# Patient Record
Sex: FEMALE | Race: WHITE | NOT HISPANIC OR LATINO | Employment: FULL TIME | URBAN - METROPOLITAN AREA
[De-identification: names, ages, dates, MRNs, and addresses within clinical notes are randomized per-mention and may not be internally consistent; named-entity substitution may affect disease eponyms.]

---

## 2018-11-08 ENCOUNTER — NON-PROVIDER VISIT (OUTPATIENT)
Dept: URGENT CARE | Facility: CLINIC | Age: 38
End: 2018-11-08

## 2018-11-08 DIAGNOSIS — Z02.1 PRE-EMPLOYMENT DRUG SCREENING: ICD-10-CM

## 2018-11-08 LAB
AMP AMPHETAMINE: NORMAL
BAR BARBITURATES: NORMAL
BZO BENZODIAZEPINES: NORMAL
COC COCAINE: NORMAL
INT CON NEG: NORMAL
INT CON POS: NORMAL
MDMA ECSTASY: NORMAL
MET METHAMPHETAMINES: NORMAL
MTD METHADONE: NORMAL
OPI OPIATES: NORMAL
OXY OXYCODONE: NORMAL
PCP PHENCYCLIDINE: NORMAL
POC URINE DRUG SCREEN OCDRS: NEGATIVE
THC: NORMAL

## 2018-11-08 PROCEDURE — 80305 DRUG TEST PRSMV DIR OPT OBS: CPT | Performed by: PHYSICIAN ASSISTANT

## 2019-03-11 ENCOUNTER — HOSPITAL ENCOUNTER (EMERGENCY)
Facility: MEDICAL CENTER | Age: 39
End: 2019-03-11

## 2019-03-11 VITALS
SYSTOLIC BLOOD PRESSURE: 180 MMHG | HEIGHT: 68 IN | HEART RATE: 103 BPM | RESPIRATION RATE: 18 BRPM | OXYGEN SATURATION: 99 % | TEMPERATURE: 98.2 F | BODY MASS INDEX: 26.1 KG/M2 | WEIGHT: 172.18 LBS | DIASTOLIC BLOOD PRESSURE: 120 MMHG

## 2019-03-11 LAB
APPEARANCE UR: CLEAR
BACTERIA #/AREA URNS HPF: ABNORMAL /HPF
BILIRUB UR QL STRIP.AUTO: NEGATIVE
COLOR UR: YELLOW
EPI CELLS #/AREA URNS HPF: ABNORMAL /HPF
GLUCOSE UR STRIP.AUTO-MCNC: NEGATIVE MG/DL
KETONES UR STRIP.AUTO-MCNC: ABNORMAL MG/DL
LEUKOCYTE ESTERASE UR QL STRIP.AUTO: NEGATIVE
MICRO URNS: ABNORMAL
NITRITE UR QL STRIP.AUTO: POSITIVE
PH UR STRIP.AUTO: 6 [PH]
PROT UR QL STRIP: NEGATIVE MG/DL
RBC # URNS HPF: ABNORMAL /HPF
RBC UR QL AUTO: NEGATIVE
SP GR UR STRIP.AUTO: 1.01
WBC #/AREA URNS HPF: ABNORMAL /HPF

## 2019-03-11 PROCEDURE — 302449 STATCHG TRIAGE ONLY (STATISTIC)

## 2019-03-11 PROCEDURE — 81001 URINALYSIS AUTO W/SCOPE: CPT

## 2019-03-11 NOTE — ED TRIAGE NOTES
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 She states that now she is weak and has dark brown blood in vomit. Patient had similar episode last November when she had a bleeding ulcer and was admitted for 7 days at [facility]. She states that this feels the exact same.

## 2019-03-12 NOTE — ED NOTES
PT called again to be placed into a room and pt did not answer.  This tech called down the [facility] and outside.

## 2019-03-12 NOTE — ED NOTES
Called pt to round and re-check vitals, unable to locate pt.  Triage nurse notified, will continue to locate pt for re-check.

## 2019-06-26 ENCOUNTER — NON-PROVIDER VISIT (OUTPATIENT)
Dept: URGENT CARE | Facility: PHYSICIAN GROUP | Age: 39
End: 2019-06-26

## 2019-06-26 DIAGNOSIS — Z02.1 PRE-EMPLOYMENT DRUG SCREENING: ICD-10-CM

## 2019-06-26 LAB
AMP AMPHETAMINE: NORMAL
COC COCAINE: NORMAL
INT CON NEG: NEGATIVE
INT CON POS: POSITIVE
MET METHAMPHETAMINES: NORMAL
OPI OPIATES: NORMAL
PCP PHENCYCLIDINE: NORMAL
POC DRUG COMMENT 753798-OCCUPATIONAL HEALTH: NEGATIVE
THC: NORMAL

## 2019-06-26 PROCEDURE — 80305 DRUG TEST PRSMV DIR OPT OBS: CPT | Performed by: PHYSICIAN ASSISTANT

## 2019-09-07 ENCOUNTER — OFFICE VISIT (OUTPATIENT)
Dept: URGENT CARE | Facility: PHYSICIAN GROUP | Age: 39
End: 2019-09-07

## 2019-09-07 VITALS
TEMPERATURE: 98.5 F | RESPIRATION RATE: 18 BRPM | OXYGEN SATURATION: 99 % | DIASTOLIC BLOOD PRESSURE: 108 MMHG | SYSTOLIC BLOOD PRESSURE: 158 MMHG | HEART RATE: 116 BPM

## 2019-09-07 DIAGNOSIS — J44.1 COPD WITH ACUTE EXACERBATION (HCC): ICD-10-CM

## 2019-09-07 DIAGNOSIS — J45.901 EXACERBATION OF PERSISTENT ASTHMA, UNSPECIFIED ASTHMA SEVERITY: ICD-10-CM

## 2019-09-07 DIAGNOSIS — R06.02 SHORTNESS OF BREATH: ICD-10-CM

## 2019-09-07 DIAGNOSIS — R32 URINARY INCONTINENCE, UNSPECIFIED TYPE: ICD-10-CM

## 2019-09-07 PROCEDURE — 99204 OFFICE O/P NEW MOD 45 MIN: CPT | Performed by: FAMILY MEDICINE

## 2019-09-07 RX ORDER — TRIAMCINOLONE ACETONIDE 40 MG/ML
60 INJECTION, SUSPENSION INTRA-ARTICULAR; INTRAMUSCULAR ONCE
Status: COMPLETED | OUTPATIENT
Start: 2019-09-07 | End: 2019-09-07

## 2019-09-07 RX ORDER — IPRATROPIUM BROMIDE AND ALBUTEROL SULFATE 2.5; .5 MG/3ML; MG/3ML
3 SOLUTION RESPIRATORY (INHALATION) EVERY 4 HOURS PRN
Qty: 30 VIAL | Refills: 2 | Status: SHIPPED | OUTPATIENT
Start: 2019-09-07 | End: 2021-03-26

## 2019-09-07 RX ORDER — IPRATROPIUM BROMIDE AND ALBUTEROL SULFATE 2.5; .5 MG/3ML; MG/3ML
3 SOLUTION RESPIRATORY (INHALATION) ONCE
Status: COMPLETED | OUTPATIENT
Start: 2019-09-07 | End: 2019-09-07

## 2019-09-07 RX ORDER — OXYBUTYNIN CHLORIDE 10 MG/1
10 TABLET, EXTENDED RELEASE ORAL DAILY
Qty: 30 TAB | Refills: 0 | Status: SHIPPED | OUTPATIENT
Start: 2019-09-07 | End: 2021-03-26

## 2019-09-07 RX ORDER — ALBUTEROL SULFATE 90 UG/1
AEROSOL, METERED RESPIRATORY (INHALATION)
Qty: 1 INHALER | Refills: 2 | Status: SHIPPED | OUTPATIENT
Start: 2019-09-07 | End: 2021-03-26

## 2019-09-07 RX ORDER — PREDNISONE 20 MG/1
TABLET ORAL
Qty: 9 TAB | Refills: 0 | Status: CANCELLED | OUTPATIENT
Start: 2019-09-07

## 2019-09-07 RX ORDER — METHYLPREDNISOLONE SODIUM SUCCINATE 125 MG/2ML
125 INJECTION, POWDER, LYOPHILIZED, FOR SOLUTION INTRAMUSCULAR; INTRAVENOUS ONCE
Status: CANCELLED | OUTPATIENT
Start: 2019-09-07 | End: 2019-09-07

## 2019-09-07 RX ADMIN — TRIAMCINOLONE ACETONIDE 60 MG: 40 INJECTION, SUSPENSION INTRA-ARTICULAR; INTRAMUSCULAR at 10:56

## 2019-09-07 RX ADMIN — IPRATROPIUM BROMIDE AND ALBUTEROL SULFATE 3 ML: 2.5; .5 SOLUTION RESPIRATORY (INHALATION) at 09:41

## 2019-09-07 NOTE — PROGRESS NOTES
Chief Complaint:    Chief Complaint   Patient presents with   • Shortness of Breath       History of Present Illness:    This is a new problem. She presents with severe shortness of breath. She has trouble talking due to the shortness of breath and coughing. She is out of her MDI. She was agreeable to Duoneb nebulizer given and afterwards her shortness of breath improved and she could talk in full sentences. She requests Rx for nebulizer machine and nebs.    She reports she has history of Asthma and COPD. She usually uses ProAir MDI but has run out. Her breathing has not been good for at least 3 months, but got much worse in past 3 days. She has taken Prednisone in past but it made her feel weird.     She also reports needing multiple PCP issues addressed as she has not seen a doctor in a long time. Most urgently, she is requesting something for urine incontinence. She has taken something in the past for this with help, she thinks it was Oxybutynin 10 mg.      Review of Systems:    Constitutional: Negative for fever, chills, and diaphoresis.   Eyes: Negative for change in vision, photophobia, pain, redness, and discharge.  ENT: Negative for ear pain, ear discharge, hearing loss, tinnitus, nasal congestion, nosebleeds, and sore throat.    Respiratory: See HPI.  Cardiovascular: Negative for chest pain, palpitations, orthopnea, claudication, leg swelling, and PND.   Gastrointestinal: Negative for abdominal pain, nausea, vomiting, diarrhea, constipation, blood in stool, and melena.   Genitourinary: See HPI.  Musculoskeletal: Negative for myalgias, joint pain, neck pain, and back pain.   Skin: Negative for rash and itching.   Neurological: Negative for dizziness, tingling, tremors, sensory change, speech change, focal weakness, seizures, loss of consciousness, and headaches.   Endo: Negative for polydipsia.   Heme: Does not bruise/bleed easily.   Psychiatric/Behavioral: No new symptoms.      Past Medical History:    Past  Medical History:   Diagnosis Date   • Anemia    • Anxiety    • Borderline personality disorder (HCC)    • COPD (chronic obstructive pulmonary disease) (HCC)    • Depression    • PUD (peptic ulcer disease)      Past Surgical History:    History reviewed. No pertinent surgical history.    Social History:    Social History     Socioeconomic History   • Marital status: Other     Spouse name: Not on file   • Number of children: Not on file   • Years of education: Not on file   • Highest education level: Not on file   Occupational History   • Not on file   Social Needs   • Financial resource strain: Not on file   • Food insecurity:     Worry: Not on file     Inability: Not on file   • Transportation needs:     Medical: Not on file     Non-medical: Not on file   Tobacco Use   • Smoking status: Never Smoker   • Smokeless tobacco: Never Used   Substance and Sexual Activity   • Alcohol use: No   • Drug use: No   • Sexual activity: Not on file   Lifestyle   • Physical activity:     Days per week: Not on file     Minutes per session: Not on file   • Stress: Not on file   Relationships   • Social connections:     Talks on phone: Not on file     Gets together: Not on file     Attends Yarsani service: Not on file     Active member of club or organization: Not on file     Attends meetings of clubs or organizations: Not on file     Relationship status: Not on file   • Intimate partner violence:     Fear of current or ex partner: Not on file     Emotionally abused: Not on file     Physically abused: Not on file     Forced sexual activity: Not on file   Other Topics Concern   • Not on file   Social History Narrative   • Not on file     Family History:    History reviewed. No pertinent family history.    Medications:    No current outpatient medications on file prior to visit.     No current facility-administered medications on file prior to visit.      Allergies:    Allergies   Allergen Reactions   • Morphine      Vomiting     •  Penicillin G      Rash     • Sulfa Drugs      Rash         Vitals:    Vitals:    09/07/19 0931   BP: 158/108   Pulse: (!) 116   Resp: 18   Temp: 36.9 °C (98.5 °F)   TempSrc: Temporal   SpO2: 99%       Physical Exam:    Constitutional: Vital signs reviewed. Appears well-developed and well-nourished. She has severe shortness of breath on presentation and she is coughing on presentation.  Eyes: Sclera white, conjunctivae clear.   ENT: External ears normal. Hearing normal. Nasal mucosa pink. Lips/teeth are normal. Oral mucosa pink and moist. Posterior pharynx: WNL.  Neck: Neck supple.   Cardiovascular: Regular rate and rhythm. No murmur.  Pulmonary/Chest: Respirations markedly labored. Moderate diffuse rales and rhonchi bilaterally.  Lymph: Cervical nodes without tenderness or enlargement.  Musculoskeletal: Initially brought in via wheelchair. After neb treatment: Normal gait. Normal range of motion. No muscular atrophy or weakness.  Neurological: Alert and oriented to person, place, and time. Muscle tone normal. Coordination normal.   Skin: No rashes or lesions. Warm, dry, normal turgor.  Psychiatric: Normal mood and affect. Behavior is normal. Judgment and thought content normal.       Assessment / Plan:    1. Shortness of breath  - ipratropium-albuterol (DUONEB) nebulizer solution    2. Exacerbation of persistent asthma, unspecified asthma severity  - albuterol 108 (90 Base) MCG/ACT Aero Soln inhalation aerosol; 2 PUFFS EVERY 4 HOURS ONLY IF NEEDED FOR COUGH, WHEEZING, OR SHORTNESS OF BREATH.  Dispense: 1 Inhaler; Refill: 2  - triamcinolone acetonide (KENALOG-40) injection 60 mg  - ipratropium-albuterol (DUONEB) 0.5-2.5 (3) MG/3ML nebulizer solution; 3 mL by Nebulization route every four hours as needed for Shortness of Breath.  Dispense: 30 Vial; Refill: 2  - Respiratory Therapy Supplies (NEBULIZER COMPRESSOR) Kit; Use with nebulizer solution.  Dispense: 1 Kit; Refill: 0    3. COPD with acute exacerbation (HCC)  -  albuterol 108 (90 Base) MCG/ACT Aero Soln inhalation aerosol; 2 PUFFS EVERY 4 HOURS ONLY IF NEEDED FOR COUGH, WHEEZING, OR SHORTNESS OF BREATH.  Dispense: 1 Inhaler; Refill: 2  - triamcinolone acetonide (KENALOG-40) injection 60 mg  - ipratropium-albuterol (DUONEB) 0.5-2.5 (3) MG/3ML nebulizer solution; 3 mL by Nebulization route every four hours as needed for Shortness of Breath.  Dispense: 30 Vial; Refill: 2  - Respiratory Therapy Supplies (NEBULIZER COMPRESSOR) Kit; Use with nebulizer solution.  Dispense: 1 Kit; Refill: 0    4. Urinary incontinence, unspecified type  - oxybutynin SR (DITROPAN-XL) 10 MG CR tablet; Take 1 Tab by mouth every day. Take to control urine incontinence.  Dispense: 30 Tab; Refill: 0      Work note given - excuse for 9/7/19.    Discussed with her DDX, management options, and risks, benefits, and alternatives to treatment plan agreed upon.    Agreeable to Duoneb neb treatment given in clinic. After neb treatment, she felt much improved.    Agreeable to medications given and prescribed. She was recommended to see PCP for multiple chronic PCP issues.    She will return to urgent care if needed.

## 2019-09-07 NOTE — LETTER
September 7, 2019         Patient: Sasha Espinoza   YOB: 1980   Date of Visit: 9/7/2019           To Whom it May Concern:    Sasha Espinoza was seen in my clinic on 9/7/2019.     Please excuse from work for 9/7/19 due to medical condition.    If you have any questions or concerns, please don't hesitate to call.        Sincerely,           Dalton Cannon M.D.  Electronically Signed

## 2020-09-18 ENCOUNTER — OFFICE VISIT (OUTPATIENT)
Dept: URGENT CARE | Facility: PHYSICIAN GROUP | Age: 40
End: 2020-09-18
Payer: MEDICAID

## 2020-09-18 VITALS
HEART RATE: 89 BPM | BODY MASS INDEX: 27.65 KG/M2 | TEMPERATURE: 98 F | RESPIRATION RATE: 18 BRPM | SYSTOLIC BLOOD PRESSURE: 122 MMHG | DIASTOLIC BLOOD PRESSURE: 78 MMHG | OXYGEN SATURATION: 97 % | WEIGHT: 182.4 LBS | HEIGHT: 68 IN

## 2020-09-18 DIAGNOSIS — B86 SCABIES INFESTATION: ICD-10-CM

## 2020-09-18 DIAGNOSIS — L29.9 PRURITUS: ICD-10-CM

## 2020-09-18 PROCEDURE — 99214 OFFICE O/P EST MOD 30 MIN: CPT | Performed by: PHYSICIAN ASSISTANT

## 2020-09-18 RX ORDER — PERMETHRIN 50 MG/G
CREAM TOPICAL
Qty: 60 G | Refills: 0 | Status: SHIPPED | OUTPATIENT
Start: 2020-09-18 | End: 2021-03-26

## 2020-09-18 ASSESSMENT — ENCOUNTER SYMPTOMS
CONSTIPATION: 0
NAUSEA: 0
COUGH: 0
FEVER: 0
VOMITING: 0
HEADACHES: 0
DIARRHEA: 0
MYALGIAS: 0
SORE THROAT: 0
SHORTNESS OF BREATH: 0
ABDOMINAL PAIN: 0
EYE PAIN: 0
CHILLS: 0

## 2020-09-18 NOTE — PROGRESS NOTES
"Subjective:   Sasha Espinoza is a 40 y.o. 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  Her patient had diffuse pruritus across the back as well as upper extremities which is been progressive for several weeks and she started to develop the same symptoms for about 1 week.  She has noticed small little macules scattered across her back and she notes that she has had ample close contact of the patient      Review of Systems   Constitutional: Negative for chills and fever.   HENT: Negative for congestion, ear pain and sore throat.    Eyes: Negative for pain.   Respiratory: Negative for cough and shortness of breath.    Cardiovascular: Negative for chest pain.   Gastrointestinal: Negative for abdominal pain, constipation, diarrhea, nausea and vomiting.   Genitourinary: Negative for dysuria.   Musculoskeletal: Negative for myalgias.   Skin: Positive for itching. Negative for rash.   Neurological: Negative for headaches.       Medications:    • albuterol Aers  • ipratropium-albuterol  • Nebulizer Compressor Kit  • oxybutynin SR  • permethrin Crea    Allergies: Morphine, Penicillin g, and Sulfa drugs    Problem List: Sasha Espinoza does not have a problem list on file.    Surgical History:  No past surgical history on file.    Past Social Hx: Sasha Espinoza  reports that she has never smoked. She has never used smokeless tobacco. She reports that she does not drink alcohol or use drugs.     Past Family Hx:  Sasha Espinoza family history is not on file.     Problem list, medications, and allergies reviewed by myself today in Epic.     Objective:     /78   Pulse 89   Temp 36.7 °C (98 °F)   Resp 18   Ht 1.727 m (5' 8\")   Wt 82.7 kg (182 lb 6.4 oz)   SpO2 97%   BMI 27.73 kg/m²     Physical Exam  Vitals signs " reviewed.   Constitutional:       Appearance: Normal appearance.   HENT:      Head: Normocephalic and atraumatic.      Right Ear: External ear normal.      Left Ear: External ear normal.      Nose: Nose normal.      Mouth/Throat:      Mouth: Mucous membranes are moist.   Eyes:      Conjunctiva/sclera: Conjunctivae normal.   Cardiovascular:      Rate and Rhythm: Normal rate.   Pulmonary:      Effort: Pulmonary effort is normal.   Skin:     General: Skin is warm and dry.      Capillary Refill: Capillary refill takes less than 2 seconds.      Comments: Scattered macules and papules across the posterior surface of the back as well as the upper neck and on the forearms.  No obvious burrows   Neurological:      Mental Status: She is alert and oriented to person, place, and time.         Assessment/Plan:     Diagnosis and associated orders:     1. Scabies infestation  permethrin (ELIMITE) 5 % Cream   2. Pruritus  permethrin (ELIMITE) 5 % Cream      Comments/MDM:     • Gave instructions on use, return precautions, expected side effects.  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    Advised the patient to follow-up with the primary care physician for recheck, reevaluation, and consideration of further management.    Please note that this dictation was created using voice recognition software. I have made a reasonable attempt to correct obvious errors, but I expect that there are errors of grammar and possibly content that I did not discover before finalizing the note.    This note was electronically signed by Wei Cabezas PA-C

## 2021-03-26 ENCOUNTER — OCCUPATIONAL MEDICINE (OUTPATIENT)
Dept: URGENT CARE | Facility: PHYSICIAN GROUP | Age: 41
End: 2021-03-26
Payer: COMMERCIAL

## 2021-03-26 ENCOUNTER — APPOINTMENT (OUTPATIENT)
Dept: RADIOLOGY | Facility: IMAGING CENTER | Age: 41
End: 2021-03-26
Attending: FAMILY MEDICINE
Payer: COMMERCIAL

## 2021-03-26 VITALS
SYSTOLIC BLOOD PRESSURE: 128 MMHG | BODY MASS INDEX: 29.35 KG/M2 | HEART RATE: 87 BPM | HEIGHT: 67 IN | TEMPERATURE: 97.9 F | OXYGEN SATURATION: 98 % | DIASTOLIC BLOOD PRESSURE: 70 MMHG | RESPIRATION RATE: 18 BRPM | WEIGHT: 187 LBS

## 2021-03-26 DIAGNOSIS — S83.92XA SPRAIN OF LEFT KNEE, UNSPECIFIED LIGAMENT, INITIAL ENCOUNTER: ICD-10-CM

## 2021-03-26 DIAGNOSIS — S89.92XA INJURY OF LEFT KNEE, INITIAL ENCOUNTER: ICD-10-CM

## 2021-03-26 PROCEDURE — 73562 X-RAY EXAM OF KNEE 3: CPT | Mod: TC,FY,LT | Performed by: FAMILY MEDICINE

## 2021-03-26 PROCEDURE — 99214 OFFICE O/P EST MOD 30 MIN: CPT | Performed by: FAMILY MEDICINE

## 2021-03-26 RX ORDER — TRAMADOL HYDROCHLORIDE 50 MG/1
TABLET ORAL
Qty: 20 TABLET | Refills: 0 | Status: SHIPPED | OUTPATIENT
Start: 2021-03-26 | End: 2021-03-31

## 2021-03-26 ASSESSMENT — PAIN SCALES - GENERAL: PAINLEVEL: 9=SEVERE PAIN

## 2021-03-26 NOTE — LETTER
"EMPLOYEE’S CLAIM FOR COMPENSATION/ REPORT OF INITIAL TREATMENT  FORM C-4    EMPLOYEE’S CLAIM - PROVIDE ALL INFORMATION REQUESTED   First Name  Sasha Last Name  Sommer Birthdate                    1980                Sex  female Claim Number   Home Address  [address] Age  40 y.o. 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 (if applicable)  LIFTING A PATIENT UP IN BED    How did this injury or occupational disease occur? (Be specific an answer in detail. Use additional sheet if necessary)  WHEN I LIFTED THE PATIENT ^ IN BED HE WAS @ A WEIRD ANGLE WHICH MADE ME @ A WEIRD ANGLE. TWISTED WRONG   If you believe that you have an occupational disease, when did you first have knowledge of the disability and it relationship to your employment?  N/A Witnesses to the Accident   AND MRS MADELIN      Nature of Injury or Occupational Disease  Workers' Compensation  Part(s) of Body Injured or Affected  Knee (L), N/A, N/A    I certify that the above is true and correct to the best of my knowledge and that I have provided this information in order to obtain the benefits of [place]’s " [place]  I hereby authorize any physician, chiropractor, surgeon, practitioner, or other person, any hospital, including [facility] or [facility], any medical service organization, any insurance company, or other institution or organization to release to each other, any medical or other information, including benefits paid or payable, pertinent to this injury or disease, except information relative to diagnosis, treatment and/or counseling for AIDS, psychological conditions, alcohol or controlled substances, for which I must give specific authorization.  A Photostat of this authorization shall be as valid as the original.     Date 3/26/2021   [facility]   Employee’s Signature   THIS REPORT MUST BE COMPLETED AND MAILED WITHIN 3 WORKING DAYS OF TREATMENT   [facility] [place]  Name of Facility  [facility]   Date  3/26/2021 Diagnosis  (S83.92XA) Sprain of left knee, unspecified ligament, initial encounter  (S89.92XA) Injury of left knee, initial encounter Is there evidence the injured employee was under the              influence of alcohol and/or another controlled substance at the time of accident?   Hour  5:34 PM Description of Injury or Disease  Diagnoses of Sprain of left knee, unspecified ligament, initial encounter and Injury of left knee, initial encounter were pertinent to this visit. No   Treatment  Crutches provided to use as needed. Hinged knee brace provided to wear on left knee as needed. Diclofenac gel prescribed for anti-inflammatory effect. Tramadol prescribed to use as needed for pain.  Have you advised the patient to remain off work five days or     more? No   X-Ray Findings  Negative  Comments:Left knee x-rays: No obvious acute bony abnormality.   If Yes   From Date  To Date      From information given by the employee, together with medical evidence, can you  "directly connect this injury or occupational disease as job incurred?  Yes If No Full Duty    No Modified Duty  Yes   Is additional medical care by a physician indicated?  Yes  Comments:Return on 4/2/21 or sooner if needed. If Modified Duty, Specify any Limitations / Restrictions  Return to work on 3/29/21 doing light duty (Level 1 or 2). Use crutches to ambulate. Wear hinged knee brace on left knee.      Do you know of any previous injury or disease contributing to this condition or occupational disease?                            No   Date  3/26/2021 Print Doctor’s Name   Dalton Cannon M.D. I certify the employer’s copy of  this form was mailed on:   Address  [address] Insurer’s Use Only     [place] Zip  [address]    Provider’s Tax ID Number  [id number] Telephone  Dept: [phone]      corey-DALTON Littlejohn M.D.  Signature:     Degree          ORIGINAL-TREATING PHYSICIAN OR CHIROPRACTOR    PAGE 2-INSURER/TPA    PAGE 3-EMPLOYER    PAGE 4-EMPLOYEE        Form C-4 (rev.10/07)           BRIEF DESCRIPTION OF RIGHTS AND BENEFITS  (Pursuant to [place] 616C.050)    Notice of Injury or Occupational Disease (Incident Report Form C-1): If an injury or occupational disease (OD) arises out of and in the course of employment, you must provide written notice to your employer as soon as practicable, but no later than 7 days after the accident or OD. Your employer shall maintain a sufficient supply of the required forms.    Claim for Compensation (Form C-4): If medical treatment is sought, the form C-4 is available at the place of initial treatment. A completed \"Claim for Compensation\" (Form C-4) must be filed within 90 days after an accident or OD. The treating physician or chiropractor must, within 3 working days after treatment, complete and mail to the employer, the employer's insurer and third-party , the Claim for Compensation.    Medical Treatment: If you require medical treatment for " your on-the-job injury or OD, you may be required to select a physician or chiropractor from a list provided by your workers’ compensation insurer, if it has contracted with an Organization for Managed Care (MCO) or Preferred Provider Organization (PPO) or providers of health care. If your employer has not entered into a contract with an MCO or PPO, you may select a physician or chiropractor from the Panel of Physicians and Chiropractors. Any medical costs related to your industrial injury or OD will be paid by your insurer.    Temporary Total Disability (TTD): If your doctor has certified that you are unable to work for a period of at least 5 consecutive days, or 5 cumulative days in a 20-day period, or places restrictions on you that your employer does not accommodate, you may be entitled to TTD compensation.    Temporary Partial Disability (TPD): If the wage you receive upon reemployment is less than the compensation for TTD to which you are entitled, the insurer may be required to pay you TPD compensation to make up the difference. TPD can only be paid for a maximum of 24 months.    Permanent Partial Disability (PPD): When your medical condition is stable and there is an indication of a PPD as a result of your injury or OD, within 30 days, your insurer must arrange for an evaluation by a rating physician or chiropractor to determine the degree of your PPD. The amount of your PPD award depends on the date of injury, the results of the PPD evaluation, your age and wage.    Permanent Total Disability (PTD): If you are medically certified by a treating physician or chiropractor as permanently and totally disabled and have been granted a PTD status by your insurer, you are entitled to receive monthly benefits not to exceed 66 2/3% of your average monthly wage. The amount of your PTD payments is subject to reduction if you previously received a lump-sum PPD award.    Vocational Rehabilitation Services: You may be  eligible for vocational rehabilitation services if you are unable to return to the job due to a permanent physical impairment or permanent restrictions as a result of your injury or occupational disease.    Transportation and Per Sujata Reimbursement: You may be eligible for travel expenses and per sujata associated with medical treatment.    Reopening: You may be able to reopen your claim if your condition worsens after claim closure.     Appeal Process: If you disagree with a written determination issued by the insurer or the insurer does not respond to your request, you may appeal to the Department of Administration, , by following the instructions contained in your determination letter. You must appeal the determination within 70 days from the date of the determination letter at [address], [address], [place], [place] [address], or [address], [facility] [address] [facility] [address], [place], [place] [address]. If you disagree with the  decision, you may appeal to the Department of Administration, . You must file your appeal within 30 days from the date of the  decision letter at [address], [address], [place], [place] [address], or [address], [address], [place], [place] [address]. If you disagree with a decision of an , you may file a petition for judicial review with the District Court. You must do so within 30 days of the Appeal Officer’s decision. You may be represented by an  at your own expense or you may contact the [facility] for possible representation.    [place]  for Injured Workers (NAIW): If you disagree with a  decision, you may request that NAIW represent you without charge at an  Hearing. For information regarding denial of benefits, you may contact the [facility] at: [address], [address], [place] [address], [phone], or [address], [address], [facility]  [place] [address], [phone]    To File a Complaint with the Division: If you wish to file a complaint with the  of the Division of Industrial Relations (DIR),  please contact the Workers’ Compensation Section, [address], [address], [place], [place] [address], telephone (476) 816-7500, or [address], [address], [place], [place] [address], telephone (926) [phone].    For assistance with Workers’ Compensation Issues: You may contact the [facility] Office for Consumer Health Assistance, [address], [address], [place], [place] [address], Toll Free 1-531.740.2389, Web site: http://[facility].[place].gov/Programs/MOLLY E-mail: [email or web address].[facility]              __________________________________________________________________                                    _________________            Employee Name / Signature                                                                                                                            Date                                                                                                                                                                                                                              D-2 (rev. 10/20)

## 2021-03-26 NOTE — LETTER
[facility] [place] [place] [address]  Phone:  [phone] - Fax:  [phone]   [facility] Progress Report and Disability Certification  Date of Service: 3/26/2021   No Show:  No  Date / Time of Next Visit: 4/2/2021   Claim Information   Patient Name: Sasha Sommer  Claim Number:     Employer:   [employer] Date of Injury: 3/26/2021     Insurer / TPA:    ID / SSN:     Occupation: CNA  Diagnosis: Diagnoses of Sprain of left knee, unspecified ligament, initial encounter and Injury of left knee, initial encounter were pertinent to this visit.    Medical Information   Related to Industrial Injury? Yes    Subjective Complaints:  DOI: 3/26/21. She was lifting a patient up in bed and sustained twisting injury to left knee. Did not fall to ground. Hurts a lot to walk and move left knee. No previous left knee problems.   Objective Findings: Severe limping due to left knee pain. Left knee is tender to palpation across entire lower part of left knee and over patella. Mild soft tissue swelling anterior left knee compared to right knee.   Pre-Existing Condition(s): None.   Assessment:   Initial Visit    Status: Additional Care Required  Comments:Return on 4/2/21 or sooner if needed.  Permanent Disability:No    Plan: Medication  Comments:Diclofenac gel prescribed for anti-inflammatory effect. Tramadol prescribed to use as needed for pain.    Diagnostics: X-ray  Comments:eft knee x-rays: No obvious acute bony abnormality.    Comments:  Crutches provided to use as needed. Hinged knee brace provided to wear on left knee as needed.    Disability Information   Status: Released to Restricted Duty    From:  3/26/2021  Through: 4/2/2021 Restrictions are: Temporary   Physical Restrictions   Sitting:    Standing:    Stooping:    Bending:      Squatting:    Walking:    Climbing:    Pushing:      Pulling:    Other:    Reaching Above Shoulder (L):   Reaching Above  Shoulder (R):       Reaching Below Shoulder (L):    Reaching Below Shoulder (R):      Not to exceed Weight Limits   Carrying(hrs):   Weight Limit(lb):   Lifting(hrs):   Weight  Limit(lb):     Comments: Return to work on 3/29/21 doing light duty (Level 1 or 2). Use crutches to ambulate. Wear hinged knee brace on left knee.     Repetitive Actions   Hands: i.e. Fine Manipulations from Grasping:     Feet: i.e. Operating Foot Controls:     Driving / Operate Machinery:     Provider Name:   Dalton Cannon M.D. Physician Signature:  Physician Name:     Clinic Name / Location: [address] [address] Clinic Phone Number: Dept: [phone]   Appointment Time: 5:30 Pm Visit Start Time: 5:34 PM   Check-In Time:  5:31 Pm Visit Discharge Time:     Original-Treating Physician or Chiropractor    Page 2-Insurer/TPA    Page 3-Employer    Page 4-Employee

## 2021-03-27 NOTE — PROGRESS NOTES
Chief Complaint:    Chief Complaint   Patient presents with   • Knee Injury     WC new--(L) knee pain-injuried while lifting a patient        History of Present Illness:    DOI: 3/26/21. She was lifting a patient up in bed and sustained twisting injury to left knee. Did not fall to ground. Hurts a lot to walk and move left knee. No previous left knee problems.      Past Medical History:    Past Medical History:   Diagnosis Date   • Anemia    • Anxiety    • Borderline personality disorder (HCC)    • COPD (chronic obstructive pulmonary disease) (HCC)    • Depression    • PUD (peptic ulcer disease)      Past Surgical History:    History reviewed. No pertinent surgical history.    Social History:    Social History     Socioeconomic History   • Marital status: Other     Spouse name: Not on file   • Number of children: Not on file   • Years of education: Not on file   • Highest education level: Not on file   Occupational History   • Not on file   Tobacco Use   • Smoking status: Never Smoker   • Smokeless tobacco: Never Used   Substance and Sexual Activity   • Alcohol use: No   • Drug use: No   • Sexual activity: Not on file   Other Topics Concern   • Not on file   Social History Narrative   • Not on file     Social Determinants of Health     Financial Resource Strain:    • Difficulty of Paying Living Expenses:    Food Insecurity:    • Worried About Running Out of Food in the Last Year:    • Ran Out of Food in the Last Year:    Transportation Needs:    • Lack of Transportation (Medical):    • Lack of Transportation (Non-Medical):    Physical Activity:    • Days of Exercise per Week:    • Minutes of Exercise per Session:    Stress:    • Feeling of Stress :    Social Connections:    • Frequency of Communication with Friends and Family:    • Frequency of Social Gatherings with Friends and Family:    • Attends Jainism Services:    • Active Member of Clubs or Organizations:    • Attends Club or Organization Meetings:    •  "Marital Status:    Intimate Partner Violence:    • Fear of Current or Ex-Partner:    • Emotionally Abused:    • Physically Abused:    • Sexually Abused:      Family History:    History reviewed. No pertinent family history.    Medications:    No current outpatient medications on file prior to visit.     No current facility-administered medications on file prior to visit.     Allergies:    Allergies   Allergen Reactions   • Morphine      Vomiting     • Penicillin G      Rash     • Sulfa Drugs      Rash         Vitals:    Vitals:    03/26/21 1739   BP: 128/70   Pulse: 87   Resp: 18   Temp: 36.6 °C (97.9 °F)   SpO2: 98%   Weight: 84.8 kg (187 lb)   Height: 1.702 m (5' 7\")       Physical Exam:    Constitutional: Vital signs reviewed. Appears well-developed and well-nourished. No acute distress.   Eyes: Sclera white, conjunctivae clear.  ENT: External ears normal. Hearing normal.  Pulmonary/Chest: Respirations non-labored.  Musculoskeletal: Severe limping due to left knee pain. Left knee is tender to palpation across entire lower part of left knee and over patella. Mild soft tissue swelling anterior left knee compared to right knee.  Neurological: Alert and oriented to person, place, and time. Muscle tone normal. Coordination normal. Light touch and sensation normal.  Skin: No rashes or lesions. Warm, dry, normal turgor.  Psychiatric: Normal mood and affect. Behavior is normal. Judgment and thought content normal.       Diagnostics:     DX-KNEE 3 VIEWS LEFT  Order: [id number]  Status:  Final result   Visible to patient:  No (scheduled for 3/27/2021  4:29 PM) Next appt:  None Dx:  Injury of left knee, initial encounter  Details    Reading Physician Reading Date Result Priority   Leigh Keith M.D.  [phone] 3/26/2021 Urgent Care      Narrative & Impression        3/26/2021 6:04 PM     HISTORY/REASON FOR EXAM:  Room 4. Twisting left knee injury today        TECHNIQUE/EXAM DESCRIPTION AND NUMBER OF VIEWS:  3 views " of the LEFT knee.     COMPARISON: None     FINDINGS:  No acute fracture, malalignment, or soft tissue abnormality.    There is no joint effusion.        IMPRESSION:     No acute fracture identified.           I personally reviewed the images.       Assessment / Plan:    1. Sprain of left knee, unspecified ligament, initial encounter  - diclofenac sodium 1 % Gel; APPLY 2-4 GRAMS TO LEFT KNEE UP TO EVERY 6 HOURS ONLY IF NEEDED FOR PAIN OR SWELLING TO HELP INFLAMMATION.  Dispense: 100 g; Refill: 1  - traMADol (ULTRAM) 50 MG Tab; 1 TAB BY MOUTH EVERY 6 HOURS ONLY IF NEEDED FOR PAIN FOR UP TO 5 DAYS.  Dispense: 20 tablet; Refill: 0  - Consent for Opiate Prescription    2. Injury of left knee, initial encounter  - DX-KNEE 3 VIEWS LEFT; Future      Discussed with her DDX, management options, and risks, benefits, and alternatives to treatment plan agreed upon.    Work restrictions: Return to work on 3/29/21 doing light duty (Level 1 or 2). Use crutches to ambulate. Wear hinged knee brace on left knee.     Crutches provided to use as needed. Hinged knee brace provided to wear on left knee as needed.    She reports she cannot take p.o. NSAIDs because of history of stomach ulcer. She has tolerated Tramadol in the past.    Diclofenac gel prescribed for anti-inflammatory effect. Tramadol prescribed to use as needed for pain.    Agreeable to medications prescribed.  report checked - no Rx x 3 years.    In my professional judgment, after considering each of the factors set forth in , the CS is medically necessary and appropriate.    Return on 4/2/21 or sooner if needed.

## 2021-04-02 ENCOUNTER — OCCUPATIONAL MEDICINE (OUTPATIENT)
Dept: URGENT CARE | Facility: PHYSICIAN GROUP | Age: 41
End: 2021-04-02
Payer: COMMERCIAL

## 2021-04-02 VITALS
HEIGHT: 67 IN | SYSTOLIC BLOOD PRESSURE: 140 MMHG | DIASTOLIC BLOOD PRESSURE: 96 MMHG | BODY MASS INDEX: 28.56 KG/M2 | TEMPERATURE: 98.3 F | RESPIRATION RATE: 12 BRPM | WEIGHT: 182 LBS | HEART RATE: 99 BPM | OXYGEN SATURATION: 97 %

## 2021-04-02 DIAGNOSIS — S89.92XD INJURY OF LEFT KNEE, SUBSEQUENT ENCOUNTER: ICD-10-CM

## 2021-04-02 DIAGNOSIS — S83.92XA SPRAIN OF LEFT KNEE, UNSPECIFIED LIGAMENT, INITIAL ENCOUNTER: ICD-10-CM

## 2021-04-02 DIAGNOSIS — S83.92XD SPRAIN OF LEFT KNEE, UNSPECIFIED LIGAMENT, SUBSEQUENT ENCOUNTER: ICD-10-CM

## 2021-04-02 PROCEDURE — 99214 OFFICE O/P EST MOD 30 MIN: CPT | Performed by: PHYSICIAN ASSISTANT

## 2021-04-02 RX ORDER — TRAMADOL HYDROCHLORIDE 50 MG/1
50 TABLET ORAL EVERY 4 HOURS PRN
COMMUNITY

## 2021-04-02 NOTE — LETTER
[address] [place] [address]  Phone:  [phone] - Fax:  [phone]   [facility] Progress Report and Disability Certification  Date of Service: 4/2/2021   No Show:  No  Date / Time of Next Visit: 4/9/2021   Claim Information   Patient Name: Sasha Sommer  Claim Number:     Employer:    Date of Injury: 3/26/2021     Insurer / TPA:    ID / SSN:     Occupation: CNA  Diagnosis: Diagnoses of Sprain of left knee, unspecified ligament, subsequent encounter, Injury of left knee, subsequent encounter, and Sprain of left knee, unspecified ligament, initial encounter were pertinent to this visit.    Medical Information   Related to Industrial Injury? Yes    Subjective Complaints:  DOI 3/26/2021: Patient presents today for follow-up of her knee injury.  She states that it hurts on and off.  She says that the diclofenac topical is what seems to help her symptoms the most along with the knee brace.  She has been wearing the hinged knee brace at all times.  She is able to ambulate without pain.  Certain movements hurt the knee.  She is wanting to go back to work with less restrictions as her work is not allowing her to be off with Workmen's Comp.  Patient is requesting refill of the diclofenac gel.   Objective Findings: No significant swelling or ecchymosis to the anterior portion of the left knee.  Mild tenderness to palpation to the left knee just distal to the patella.  No laxity with valgus or varus motion.  Normal range of motion and strength against resistance with flexion and extension.  Patient has slightly antalgic gait with hinged knee brace in place.  Negative anterior drawer test.  Neurovascular intact distally to the left lower extremity.   Pre-Existing Condition(s):     Assessment:   Condition Improved    Status: Additional Care Required  Permanent Disability:No    Plan:      Diagnostics:      Comments:       Disability Information    Status: Released to Restricted Duty    From:  4/2/2021  Through: 4/9/2021 Restrictions are: Temporary   Physical Restrictions   Sitting:    Standing:    Stooping:    Bending:      Squatting:    Walking:    Climbing:    Pushing:      Pulling:    Other:    Reaching Above Shoulder (L):   Reaching Above Shoulder (R):       Reaching Below Shoulder (L):    Reaching Below Shoulder (R):      Not to exceed Weight Limits   Carrying(hrs):   Weight Limit(lb):   Lifting(hrs):   Weight  Limit(lb):     Comments: Injury still most consistent with strain or sprain of the left knee.  No indication for MRI at this time.  Patient will continue with wearing the hinged knee brace and sent in more diclofenac gel for her to use as needed and as prescribed.  She will continue to rest and ice the area.  She can work as she is comfortable while wearing the hinged knee brace.  We will follow-up in 1 week.    Repetitive Actions   Hands: i.e. Fine Manipulations from Grasping:     Feet: i.e. Operating Foot Controls:     Driving / Operate Machinery:     Provider Name:   Delroy Izaguirre P.A.-C. Physician Signature:  Physician Name:     Clinic Name / Location: [address] [address] Clinic Phone Number: Dept: [phone]   Appointment Time: 9:00 Am Visit Start Time: 9:11 AM   Check-In Time:  8:51 Am Visit Discharge Time:  10:24   Original-Treating Physician or Chiropractor    Page 2-Insurer/TPA    Page 3-Employer    Page 4-Employee

## 2021-04-02 NOTE — LETTER
[address] [place] [address]  Phone:  [phone] - Fax:  [phone]   [facility] Progress Report and Disability Certification  Date of Service: 4/2/2021   No Show:  No  Date / Time of Next Visit: 4/9/2021   Claim Information   Patient Name: Sasha Sommer  Claim Number:     Employer:   [employer]  Date of Injury: 3/26/2021     Insurer / TPA:    ID / SSN:     Occupation: CNA  Diagnosis: Diagnoses of Sprain of left knee, unspecified ligament, subsequent encounter, Injury of left knee, subsequent encounter, and Sprain of left knee, unspecified ligament, initial encounter were pertinent to this visit.    Medical Information   Related to Industrial Injury? No    Subjective Complaints:  DOI 3/26/2021: Patient presents today for follow-up of her knee injury.  She states that it hurts on and off.  She says that the diclofenac topical is what seems to help her symptoms the most along with the knee brace.  She has been wearing the hinged knee brace at all times.  She is able to ambulate without pain.  Certain movements hurt the knee.  She is wanting to go back to work with less restrictions as her work is not allowing her to be off with Workmen's Comp.  Patient is requesting refill of the diclofenac gel.   Objective Findings: No significant swelling or ecchymosis to the anterior portion of the left knee.  Mild tenderness to palpation to the left knee just distal to the patella.  No laxity with valgus or varus motion.  Normal range of motion and strength against resistance with flexion and extension.  Patient has slightly antalgic gait with hinged knee brace in place.  Negative anterior drawer test.  Neurovascular intact distally to the left lower extremity.   Pre-Existing Condition(s):     Assessment:   Condition Improved    Status: Additional Care Required  Permanent Disability:No    Plan:      Diagnostics:      Comments:          Disability Information   Status: Released to Restricted Duty    From:  4/2/2021  Through: 4/9/2021 Restrictions are: Temporary   Physical Restrictions   Sitting:    Standing:    Stooping:    Bending:      Squatting:    Walking:    Climbing:    Pushing:      Pulling:    Other:    Reaching Above Shoulder (L):   Reaching Above Shoulder (R):       Reaching Below Shoulder (L):    Reaching Below Shoulder (R):      Not to exceed Weight Limits   Carrying(hrs):   Weight Limit(lb):   Lifting(hrs):   Weight  Limit(lb):     Comments: Injury still most consistent with strain or sprain of the left knee.  No indication for MRI at this time.  Patient will continue with wearing the hinged knee brace and sent in more diclofenac gel for her to use as needed and as prescribed.  She will continue to rest and ice the area.  She can work as she is comfortable while wearing the hinged knee brace.  We will follow-up in 1 week.    Repetitive Actions   Hands: i.e. Fine Manipulations from Grasping:     Feet: i.e. Operating Foot Controls:     Driving / Operate Machinery:     Provider Name:   Delroy Izaguirre P.A.-C. Physician Signature:  Physician Name:     Clinic Name / Location: [address] [address] Clinic Phone Number: Dept: [phone]   Appointment Time: 9:00 Am Visit Start Time: 9:11 AM   Check-In Time:  8:51 Am Visit Discharge Time:     Original-Treating Physician or Chiropractor    Page 2-Insurer/TPA    Page 3-Employer    Page 4-Employee

## 2021-04-02 NOTE — PROGRESS NOTES
"Subjective:      Sasha Sommer is a 40 y.o. female who presents with Work-Related Injury (Follow-up, L knee injury 03/26, states she is doing better )      DOI 3/26/2021: Patient presents today for follow-up of her knee injury.  She states that it hurts on and off.  She says that the diclofenac topical is what seems to help her symptoms the most along with the knee brace.  She has been wearing the hinged knee brace at all times.  She is able to ambulate without pain.  Certain movements hurt the knee.  She is wanting to go back to work with less restrictions as her work is not allowing her to be off with Workmen's Comp.  Patient is requesting refill of the diclofenac gel.       HPI  Patient presents today for follow-up of work-related injury as described above.  Review of Systems   Musculoskeletal:        Left knee pain     PMH: No pertinent past medical history to this problem  MEDS: Medications were reviewed in Epic  ALLERGIES: Allergies were reviewed in Epic  SOCHX: Works as a CNA   FH: No pertinent family history to this problem     Objective:     /96   Pulse 99   Temp 36.8 °C (98.3 °F) (Temporal)   Resp 12   Ht 1.702 m (5' 7\")   Wt 82.6 kg (182 lb)   SpO2 97%   BMI 28.51 kg/m²      Physical Exam  Vitals and nursing note reviewed.   Constitutional:       General: She is not in acute distress.     Appearance: Normal appearance. She is well-developed. She is not ill-appearing or toxic-appearing.   HENT:      Head: Normocephalic and atraumatic.      Right Ear: Hearing normal.      Left Ear: Hearing normal.   Eyes:      Conjunctiva/sclera: Conjunctivae normal.   Cardiovascular:      Rate and Rhythm: Normal rate and regular rhythm.      Heart sounds: Normal heart sounds.   Pulmonary:      Effort: Pulmonary effort is normal.      Breath sounds: Normal breath sounds.   Musculoskeletal:      Comments: Normal movement in all 4 extremities   Skin:     General: Skin is warm and dry.   Neurological:      " Mental Status: She is alert.      Coordination: Coordination normal.   Psychiatric:         Mood and Affect: Mood normal.       No significant swelling or ecchymosis to the anterior portion of the left knee.  Mild tenderness to palpation to the left knee just distal to the patella.  No laxity with valgus or varus motion.  Normal range of motion and strength against resistance with flexion and extension.  Patient has slightly antalgic gait with hinged knee brace in place.  Negative anterior drawer test.  Neurovascular intact distally to the left lower extremity.    Assessment/Plan:   1. Sprain of left knee, unspecified ligament, subsequent encounter    2. Injury of left knee, subsequent encounter    3. Sprain of left knee, unspecified ligament, initial encounter    - diclofenac sodium 1 % Gel; APPLY 2-4 GRAMS TO LEFT KNEE UP TO EVERY 6 HOURS ONLY IF NEEDED FOR PAIN OR SWELLING TO HELP INFLAMMATION.  Dispense: 100 g; Refill: 1  Injury still most consistent with strain or sprain of the left knee.  No indication for MRI at this time.  Patient will continue with wearing the hinged knee brace and sent in more diclofenac gel for her to use as needed and as prescribed.  She will continue to rest and ice the area.  She can work as she is comfortable while wearing the hinged knee brace.  We will follow-up in 1 week.  Differential diagnosis, natural history, supportive care, and indications for immediate follow-up discussed.   Patient given instructions and understanding of medications and treatment.    If not improving in 3-5 days, F/U with PCP or return to UC if symptoms worsen.  Greater than 30 minutes were spent reviewing patient's chart, examining and obtaining history from patient, and discussing plan of care.     Patient agreeable to plan.

## 2021-04-09 ENCOUNTER — OCCUPATIONAL MEDICINE (OUTPATIENT)
Dept: URGENT CARE | Facility: PHYSICIAN GROUP | Age: 41
End: 2021-04-09
Payer: COMMERCIAL

## 2021-04-09 VITALS
BODY MASS INDEX: 28.52 KG/M2 | WEIGHT: 188.2 LBS | HEIGHT: 68 IN | SYSTOLIC BLOOD PRESSURE: 120 MMHG | TEMPERATURE: 99.8 F | HEART RATE: 79 BPM | DIASTOLIC BLOOD PRESSURE: 80 MMHG

## 2021-04-09 DIAGNOSIS — S83.92XD SPRAIN OF LEFT KNEE, UNSPECIFIED LIGAMENT, SUBSEQUENT ENCOUNTER: ICD-10-CM

## 2021-04-09 PROCEDURE — 99213 OFFICE O/P EST LOW 20 MIN: CPT | Performed by: FAMILY MEDICINE

## 2021-04-09 RX ORDER — TRAZODONE HYDROCHLORIDE 50 MG/1
50 TABLET ORAL
COMMUNITY
Start: 2020-08-31 | End: 2021-08-26

## 2021-04-09 RX ORDER — VALACYCLOVIR HYDROCHLORIDE 1 G/1
TABLET, FILM COATED ORAL
COMMUNITY
Start: 2021-01-12

## 2021-04-09 RX ORDER — OXYBUTYNIN CHLORIDE 10 MG/1
10 TABLET, EXTENDED RELEASE ORAL
COMMUNITY
Start: 2020-08-31 | End: 2021-08-26

## 2021-04-09 NOTE — PROGRESS NOTES
"Chief Complaint:    Chief Complaint   Patient presents with   • Follow-Up   • Knee Injury     \"feels much better\"       History of Present Illness:    DOI: 3/26/21. Compared to last visit 4/2/21, left knee is improved. Just a little discomfort in left knee when she gets up and it resolves in a few seconds. Using Diclofenac gel with help. Has not needed to use knee brace on left knee in past 2 days. Feels can return to full duty and does not feel need to return.      Past Medical History:     Past Medical History:   Diagnosis Date   • Anemia    • Anxiety    • Borderline personality disorder (HCC)    • COPD (chronic obstructive pulmonary disease) ([facility])    • Depression    • PUD (peptic ulcer disease)      Past Surgical History:    History reviewed. No pertinent surgical history.    Social History:    Social History     Socioeconomic History   • Marital status: Other     Spouse name: Not on file   • Number of children: Not on file   • Years of education: Not on file   • Highest education level: Not on file   Occupational History   • Not on file   Tobacco Use   • Smoking status: Never Smoker   • Smokeless tobacco: Never Used   Substance and Sexual Activity   • Alcohol use: No   • Drug use: Yes     Types: Marijuana, Inhaled   • Sexual activity: Not on file   Other Topics Concern   • Not on file   Social History Narrative   • Not on file     Social Determinants of Health     Financial Resource Strain:    • Difficulty of Paying Living Expenses:    Food Insecurity:    • Worried About Running Out of Food in the Last Year:    • Ran Out of Food in the Last Year:    Transportation Needs:    • Lack of Transportation (Medical):    • Lack of Transportation (Non-Medical):    Physical Activity:    • Days of Exercise per Week:    • Minutes of Exercise per Session:    Stress:    • Feeling of Stress :    Social Connections:    • Frequency of Communication with Friends and Family:    • Frequency of Social Gatherings with Friends and " "Family:    • Attends Adventism Services:    • Active Member of Clubs or Organizations:    • Attends Club or Organization Meetings:    • Marital Status:    Intimate Partner Violence:    • Fear of Current or Ex-Partner:    • Emotionally Abused:    • Physically Abused:    • Sexually Abused:      Family History:    History reviewed. No pertinent family history.    Medications:    Current Outpatient Medications on File Prior to Visit   Medication Sig Dispense Refill   • oxybutynin SR (DITROPAN-XL) 10 MG CR tablet Take 10 mg by mouth.     • traZODone (DESYREL) 50 MG Tab Take 50 mg by mouth.     • valacyclovir (VALTREX) 1 GM Tab   See Instructions, 1 tab Oral BID x 5 days, # 10 EA, 2 Refill(s), Signed: 01/12/21 14:02:00 PST, Maintenance, Pharmacy: [id number], 1 tab Oral BID x 5 days, 170 cm, 01/04/21 8:01:00 PST, Height, 85.5 kg, 01/11/21 8:47:00 PST, Weight Dosing     • PROPRANOLOL HCL PO Take  by mouth.     • traMADol (ULTRAM) 50 MG Tab Take 50 mg by mouth every four hours as needed.     • diclofenac sodium 1 % Gel APPLY 2-4 GRAMS TO LEFT KNEE UP TO EVERY 6 HOURS ONLY IF NEEDED FOR PAIN OR SWELLING TO HELP INFLAMMATION. 100 g 1     No current facility-administered medications on file prior to visit.     Allergies:    Allergies   Allergen Reactions   • Morphine      Vomiting     • Penicillin G      Rash     • Sulfa Drugs      Rash         Vitals:    Vitals:    04/09/21 0910   BP: 120/80   Pulse: 79   Temp: 37.7 °C (99.8 °F)   TempSrc: Temporal   Weight: 85.4 kg (188 lb 3.2 oz)   Height: 1.727 m (5' 8\")       Physical Exam:    Constitutional: Vital signs reviewed. Appears well-developed and well-nourished. No acute distress.   Eyes: Sclera white, conjunctivae clear.  ENT: External ears normal. Hearing normal.  Pulmonary/Chest: Respirations non-labored.  Musculoskeletal: Normal gait. Normal range of motion. No muscular atrophy or weakness.  Neurological: Alert and oriented to person, place, and time. Muscle tone " normal. Coordination normal.   Skin: No rashes or lesions. Warm, dry, normal turgor.  Psychiatric: Normal mood and affect. Behavior is normal. Judgment and thought content normal.       Assessment / Plan:    1. Sprain of left knee, unspecified ligament, subsequent encounter      Discussed with her DDX, management options, and risks, benefits, and alternatives to treatment plan agreed upon.    Full duty.    Continue using Diclofenac 1% gel if needed for pain and/or swelling for anti-inflammatory effect.    Discharged / MMI.

## 2021-04-09 NOTE — LETTER
[address] [place] [address]  Phone:  [phone] - Fax:  [phone]   [facility] Progress Report and Disability Certification  Date of Service: 4/9/2021   No Show:  No  Date / Time of Next Visit:     Claim Information   Patient Name: Sasha Sommer Claim Number:     Employer:    Date of Injury: 3/26/2021     Insurer / TPA:    ID / SSN:     Occupation: CNA  Diagnosis: The encounter diagnosis was Sprain of left knee, unspecified ligament, subsequent encounter.    Medical Information   Related to Industrial Injury? Yes    Subjective Complaints:  DOI: 3/26/21. Compared to last visit 4/2/21, left knee is improved. Just a little discomfort in left knee when she gets up and it resolves in a few seconds. Using Diclofenac gel with help. Has not needed to use knee brace on left knee in past 2 days. Feels can return to full duty and does not feel need to return.   Objective Findings: Normal gait. Normal range of motion.   Pre-Existing Condition(s):     Assessment:   Condition Improved    Status: Discharged /  MMI  Permanent Disability:No    Plan: Medication  Comments:Continue using Diclofenac 1% gel if needed for pain and/or swelling for anti-inflammatory effect.    Diagnostics:      Comments:       Disability Information   Status: Released to Full Duty    From:     Through:   Restrictions are:     Physical Restrictions   Sitting:    Standing:    Stooping:    Bending:      Squatting:    Walking:    Climbing:    Pushing:      Pulling:    Other:    Reaching Above Shoulder (L):   Reaching Above Shoulder (R):       Reaching Below Shoulder (L):    Reaching Below Shoulder (R):      Not to exceed Weight Limits   Carrying(hrs):   Weight Limit(lb):   Lifting(hrs):   Weight  Limit(lb):     Comments:      Repetitive Actions   Hands: i.e. Fine Manipulations from Grasping:     Feet: i.e. Operating Foot Controls:     Driving / Operate Machinery:        Provider Name:   Dalton Cannon M.D. Physician Signature:  Physician Name:     Clinic Name / Location: [address] [address] Clinic Phone Number: Dept: [phone]   Appointment Time: 9:00 Am Visit Start Time: 9:07 AM   Check-In Time:  8:55 Am Visit Discharge Time:  9:25 AM   Original-Treating Physician or Chiropractor    Page 2-Insurer/TPA    Page 3-Employer    Page 4-Employee

## 2022-08-05 ENCOUNTER — OFFICE VISIT (OUTPATIENT)
Dept: NEUROLOGY | Facility: MEDICAL CENTER | Age: 42
End: 2022-08-05
Attending: PSYCHIATRY & NEUROLOGY
Payer: MEDICAID

## 2022-08-05 VITALS
HEIGHT: 69 IN | DIASTOLIC BLOOD PRESSURE: 76 MMHG | HEART RATE: 91 BPM | WEIGHT: 194 LBS | TEMPERATURE: 98.1 F | BODY MASS INDEX: 28.73 KG/M2 | OXYGEN SATURATION: 97 % | SYSTOLIC BLOOD PRESSURE: 126 MMHG

## 2022-08-05 DIAGNOSIS — G56.03 BILATERAL CARPAL TUNNEL SYNDROME: Primary | ICD-10-CM

## 2022-08-05 PROBLEM — E55.9 VITAMIN D DEFICIENCY: Status: ACTIVE | Noted: 2022-08-05

## 2022-08-05 PROBLEM — R68.89 INTOLERANT OF HEAT: Status: ACTIVE | Noted: 2022-08-05

## 2022-08-05 PROBLEM — F51.05 INSOMNIA RELATED TO ANOTHER MENTAL DISORDER: Status: ACTIVE | Noted: 2022-08-05

## 2022-08-05 PROBLEM — M25.519 SHOULDER PAIN: Status: ACTIVE | Noted: 2022-08-05

## 2022-08-05 PROBLEM — R20.2 PARESTHESIA OF UPPER EXTREMITY: Status: ACTIVE | Noted: 2022-08-05

## 2022-08-05 PROBLEM — N39.41 URGE INCONTINENCE OF URINE: Status: ACTIVE | Noted: 2022-08-05

## 2022-08-05 PROBLEM — K08.9 DISORDER OF TOOTH: Status: ACTIVE | Noted: 2022-08-05

## 2022-08-05 PROBLEM — J45.909 ASTHMA: Status: ACTIVE | Noted: 2022-08-05

## 2022-08-05 PROBLEM — I10 ESSENTIAL HYPERTENSION: Status: ACTIVE | Noted: 2022-08-05

## 2022-08-05 PROBLEM — B00.9 HERPES SIMPLEX VIRUS (HSV) INFECTION: Status: ACTIVE | Noted: 2022-08-05

## 2022-08-05 PROBLEM — L20.9 ATOPIC DERMATITIS: Status: ACTIVE | Noted: 2022-08-05

## 2022-08-05 PROBLEM — J30.9 ALLERGIC RHINITIS: Status: ACTIVE | Noted: 2022-08-05

## 2022-08-05 PROBLEM — A53.9 SYPHILIS: Status: ACTIVE | Noted: 2022-08-05

## 2022-08-05 PROBLEM — N92.0 MENORRHAGIA: Status: ACTIVE | Noted: 2022-08-05

## 2022-08-05 PROBLEM — F32.A DEPRESSIVE DISORDER: Status: ACTIVE | Noted: 2022-08-05

## 2022-08-05 PROBLEM — F41.9 ANXIETY: Status: ACTIVE | Noted: 2022-08-05

## 2022-08-05 PROBLEM — D64.9 ANEMIA: Status: ACTIVE | Noted: 2022-08-05

## 2022-08-05 PROBLEM — K21.00 GASTROESOPHAGEAL REFLUX DISEASE WITH ESOPHAGITIS: Status: ACTIVE | Noted: 2022-08-05

## 2022-08-05 PROBLEM — J45.40 MODERATE PERSISTENT ASTHMA: Status: ACTIVE | Noted: 2022-08-05

## 2022-08-05 PROBLEM — L98.9 SKIN LESION: Status: ACTIVE | Noted: 2022-08-05

## 2022-08-05 PROCEDURE — 99211 OFF/OP EST MAY X REQ PHY/QHP: CPT | Performed by: PSYCHIATRY & NEUROLOGY

## 2022-08-05 PROCEDURE — 99204 OFFICE O/P NEW MOD 45 MIN: CPT | Performed by: PSYCHIATRY & NEUROLOGY

## 2022-08-05 ASSESSMENT — PATIENT HEALTH QUESTIONNAIRE - PHQ9: CLINICAL INTERPRETATION OF PHQ2 SCORE: 0

## 2022-08-05 NOTE — PROGRESS NOTES
"[facility] NEUROLOGY  GENERAL NEUROLOGY  NEW PATIENT VISIT    Referral source: Austin Geronimo II, MD    CC: \"other symptoms and signs involving the musculoskeletal system\"    HISTORY OF ILLNESS:  Sasha Sommer is a 42 y.o. woman with a history most notable for substance abuse (in recovery).  Today, she was unaccompanied, and she provided the following history:    2017:  Sasha was admitted to [facility] for internal bleeding.  She was also diagnosed with Syphilis.    She was treated for Syphilis, but it keeps coming back.    2018:  Sasha stopped using illicit drugs (methamphetamine).  She did not seek out medical care while she was using drugs.    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  She attributes this to heat.  She would occasionally feel \"zaps\" in the right lower extremities.  She lost the vision in the right eye.    Lately, workup included MRI and LP.  The results were unremarkable.    MEDICAL AND SURGICAL HISTORY:  Past Medical History:   Diagnosis Date   • Anemia    • Anxiety    • Borderline personality disorder (HCC)    • COPD (chronic obstructive pulmonary disease) (HCC)    • Depression    • PUD (peptic ulcer disease)      No past surgical history on file.  MEDICATIONS:  Current Outpatient Medications   Medication Sig   • valacyclovir (VALTREX) 1 GM Tab   See Instructions, 1 tab Oral BID x 5 days, # 10 EA, 2 Refill(s), Signed: 01/12/21 14:02:00 PST, Maintenance, Pharmacy: [id number], 1 tab Oral BID x 5 days, 170 cm, 01/04/21 8:01:00 PST, Height, 85.5 kg, 01/11/21 8:47:00 PST, Weight Dosing (Patient not taking: Reported on 8/5/2022)   • PROPRANOLOL HCL PO Take  by mouth. (Patient not taking: Reported on 8/5/2022)   • traMADol (ULTRAM) 50 MG Tab Take 50 mg by mouth every four hours as needed. (Patient not taking: Reported on 8/5/2022)   • diclofenac sodium 1 % Gel APPLY 2-4 GRAMS TO LEFT KNEE UP TO EVERY 6 HOURS ONLY IF NEEDED " FOR PAIN OR SWELLING TO HELP INFLAMMATION. (Patient not taking: Reported on 8/5/2022)     SOCIAL HISTORY:  Social History     Tobacco Use   • Smoking status: Never Smoker   • Smokeless tobacco: Never Used   Substance Use Topics   • Alcohol use: No     Social History     Social History Narrative   • Not on file     FAMILY HISTORY:  No family history on file.     REVIEW OF SYSTEMS:  A ROS was completed.  Pertinent positives and negatives were included in the HPI, above.  All other systems were reviewed and are negative.    PHYSICAL EXAM:  General/Medical:  - NAD  - hair, skin, nails, and joints were normal    Neuro:  MENTAL STATUS: awake and alert; no deficits of speech or language; oriented to conversation; affect was anxious    CRANIAL NERVES:    II: acuity: J1+/J1, fields: intact to confrontation, pupils: 6/6 to 4/4 without a relative afferent pupillary defect and without eccentricity, discs: NT    III/IV/VI: versions: intact without nystagmus    V: facial sensation: reduced over the left john-face, splitting the forehead    VII: facial expression: symmetric    VIII: hearing: intact to finger rub    IX/X: palate: elevates symmetrically    XI: shoulder shrug: symmetric    XII: tongue: midline    MOTOR:  - bulk: normal throughout  - tone: NT  Upper Extremity Strength  (R/L)    5/5   Elbow flexion 5/5   Elbow extension 5/5   Shoulder abduction 5/5     Lower Extremity Strength  (R/L)   Hip flexion 5/5   Knee extension 5/5   Knee flexion 5/5   Ankle plantarflexion 5/5   Ankle dorsiflexion 5/5     - can walk on toes and heels  - pronator drift: absent  - abnormal movements: none    SENSATION:  - light touch: reduced over the left lateral, proximal upper extremity  - vibration (R/L, seconds): 15/16 at the great toes  - pinprick: reduced over the left lateral arm  - proprioception: NT  - Romberg: absent  - Tinel and Phalen signs: positive, bilaterally    COORDINATION:  - finger to nose: normal, no ataxia on exam  -  "finger tapping: rapid and accurate, bilaterally    REFLEXES:  Reflex Right Left   BR 2+ 2+   Biceps 2+ 2+   Triceps 2+ 2+   Patellae 2+ 2+   Achilles NT NT   Toes down down     GAIT:  - narrow base and normal  - heel-raised/toe-raised gait: intact/intact  - tandem gait: intact    REVIEW OF IMAGING STUDIES: I reviewed the following studies:  MRI Brain:  Date: 3/15/2022  W/o and w/ contrast?: yes  Indication: \"neurologic symptoms\"  Comparison: none  Impression:  \"No acute intracranial process.\"    REVIEW OF LABORATORY STUDIES:  No recent data available.    ASSESSMENT:  Sasha Sommer is a 42 y.o. woman with a history most notable for substance abuse (in recovery).  Sasha reports a number of nonspecific symptoms.  She was concerned about ongoing neurosyphilis.  I reviewed her MRI brain report and CSF results.  Her pupillary responses and vibration sensation at the great toes were normal.  I have a fairly low suspicion of neurosyphilis, but her PCP might consider referral to infectious disease.   The most notable physical exam findings were positive Tinel and Phalen signs bilaterally as well as reduced sensation over the left lateral, proximal forearm.  Plan for EMG/NCS of the bilateral upper extremities.  We will follow-up in a few months once results are available.    PLAN:  Concern for Neurosyphilis:  - primary team can consider referral to ID    Likely Bilateral CTS:  - EMG/NCS    Follow-Up:  - Return in about 2 months (around 10/5/2022) for Long.    Signed: Dangelo Shore M.D.    BILLING DOCUMENTATION:   I spent 53 minutes reviewing the medical record, interviewing and examining the patient, discussing my impression (see \"assessment\" above), and coordinating care.    "

## 2022-09-08 ENCOUNTER — APPOINTMENT (OUTPATIENT)
Dept: NEUROLOGY | Facility: MEDICAL CENTER | Age: 42
End: 2022-09-08
Attending: PSYCHIATRY & NEUROLOGY
Payer: MEDICAID

## 2022-10-21 ENCOUNTER — TELEPHONE (OUTPATIENT)
Dept: NEUROLOGY | Facility: MEDICAL CENTER | Age: 42
End: 2022-10-21
Payer: MEDICAID

## 2022-10-21 NOTE — TELEPHONE ENCOUNTER
Established Patient     [employer] Patient is checked in Patient Demographics? Yes    Is visit type and length correct?  Yes    Is referral attached to visit? Yes    Were records received from referring provider? Yes    Patient was not contacted to have someone accompany them to visit?    Is this appointment scheduled as a Hospital Follow-Up?  No    Does the patient require any pre procedure or post procedure follow up? No    If any orders were placed at last visit or intended to be done for this visit do we have Results/Consult Notes? Yes  Labs - Labs were not ordered at last office visit.  Note: If patient appointment is for lab review and patient did not complete labs, check with provider if OK to reschedule patient until labs completed.  Imaging - Imaging was not ordered at last office visit.  Referrals - No referrals were ordered at last office visit.        10.  If patient appointment is for Botox - is order pended for provider? No

## 2023-03-09 ENCOUNTER — HOSPITAL ENCOUNTER (EMERGENCY)
Facility: MEDICAL CENTER | Age: 43
End: 2023-03-09
Payer: MEDICAID

## 2023-03-09 VITALS
HEART RATE: 73 BPM | TEMPERATURE: 97.9 F | DIASTOLIC BLOOD PRESSURE: 107 MMHG | SYSTOLIC BLOOD PRESSURE: 158 MMHG | HEIGHT: 68 IN | OXYGEN SATURATION: 99 % | WEIGHT: 190.26 LBS | RESPIRATION RATE: 20 BRPM | BODY MASS INDEX: 28.83 KG/M2

## 2023-03-09 LAB — EKG IMPRESSION: NORMAL

## 2023-03-09 PROCEDURE — 93005 ELECTROCARDIOGRAM TRACING: CPT

## 2023-03-09 PROCEDURE — 302449 STATCHG TRIAGE ONLY (STATISTIC)

## 2023-03-10 NOTE — ED TRIAGE NOTES
Chief Complaint   Patient presents with    Back Pain     Bent over this am and felt a sudden pain in her left upper back. Feels worse on deep inspiration. EKg done on arrival.    EKG done on arrival

## 2024-05-01 ENCOUNTER — OFFICE VISIT (OUTPATIENT)
Dept: URGENT CARE | Facility: CLINIC | Age: 44
End: 2024-05-01
Payer: MEDICAID

## 2024-05-01 VITALS
OXYGEN SATURATION: 98 % | WEIGHT: 185.85 LBS | HEIGHT: 67 IN | DIASTOLIC BLOOD PRESSURE: 85 MMHG | SYSTOLIC BLOOD PRESSURE: 139 MMHG | HEART RATE: 78 BPM | TEMPERATURE: 97.8 F | RESPIRATION RATE: 14 BRPM | BODY MASS INDEX: 29.17 KG/M2

## 2024-05-01 DIAGNOSIS — H66.012 NON-RECURRENT ACUTE SUPPURATIVE OTITIS MEDIA OF LEFT EAR WITH SPONTANEOUS RUPTURE OF TYMPANIC MEMBRANE: ICD-10-CM

## 2024-05-01 PROCEDURE — 3079F DIAST BP 80-89 MM HG: CPT | Performed by: NURSE PRACTITIONER

## 2024-05-01 PROCEDURE — 99213 OFFICE O/P EST LOW 20 MIN: CPT | Performed by: NURSE PRACTITIONER

## 2024-05-01 PROCEDURE — 3075F SYST BP GE 130 - 139MM HG: CPT | Performed by: NURSE PRACTITIONER

## 2024-05-01 RX ORDER — DOXYCYCLINE HYCLATE 100 MG
100 TABLET ORAL 2 TIMES DAILY
Qty: 14 TABLET | Refills: 0 | Status: SHIPPED | OUTPATIENT
Start: 2024-05-01 | End: 2024-05-08

## 2024-05-03 ASSESSMENT — ENCOUNTER SYMPTOMS
CHILLS: 0
DIAPHORESIS: 0
SINUS PAIN: 0
FEVER: 0
COUGH: 0
SORE THROAT: 0

## 2024-05-03 NOTE — PROGRESS NOTES
"Subjective     Sasha Sommer is a 44 y.o. female who presents with Otalgia (Bilateral x 2 days )            Sasha comes in today with a 2 day history of left otalgia.  She denies any fever, chills, myalgia, sore throat, nasal congestion or cough.  No suspected barotrauma.  No history of chronic or recurrent OM.  NO recent antibiotics.  Not taking any meds to treat the symptoms.        Review of Systems   Constitutional:  Positive for malaise/fatigue. Negative for chills, diaphoresis and fever.   HENT:  Positive for ear pain. Negative for congestion, ear discharge, sinus pain and sore throat.    Respiratory:  Negative for cough.      Medications, Allergies, and current problem list reviewed today in Epic         Objective     Blood Pressure 139/85   Pulse 78   Temperature 36.6 °C (97.8 °F) (Temporal)   Respiration 14   Height 1.702 m (5' 7\")   Weight 84.3 kg (185 lb 13.6 oz)   Oxygen Saturation 98%   Body Mass Index 29.11 kg/m²      Physical Exam  Vitals reviewed.   Constitutional:       General: She is not in acute distress.     Appearance: Normal appearance. She is well-developed. She is not ill-appearing, toxic-appearing or diaphoretic.   HENT:      Head: Normocephalic.      Right Ear: Tympanic membrane, ear canal and external ear normal. There is no impacted cerumen.      Left Ear: Ear canal and external ear normal. There is no impacted cerumen.      Ears:      Comments: Left TM is erythematous and bulging with a purulent effusion.  No pre- or post auricular lymphadenopathy or mastoid pain.     Nose: Nose normal. No congestion or rhinorrhea.      Mouth/Throat:      Mouth: Mucous membranes are moist.      Pharynx: No oropharyngeal exudate or posterior oropharyngeal erythema.   Eyes:      General: No scleral icterus.        Right eye: No discharge.         Left eye: No discharge.      Conjunctiva/sclera: Conjunctivae normal.   Neck:      Thyroid: No thyromegaly.      Vascular: No JVD.      Trachea: No " tracheal deviation.   Cardiovascular:      Rate and Rhythm: Normal rate and regular rhythm.      Heart sounds: Normal heart sounds. No murmur heard.     No friction rub. No gallop.   Pulmonary:      Effort: Pulmonary effort is normal. No respiratory distress.      Breath sounds: Normal breath sounds. No stridor. No wheezing, rhonchi or rales.   Chest:      Chest wall: No tenderness.   Musculoskeletal:      Cervical back: Neck supple.   Lymphadenopathy:      Cervical: No cervical adenopathy.   Skin:     General: Skin is warm and dry.      Coloration: Skin is not jaundiced or pale.   Neurological:      Mental Status: She is alert and oriented to person, place, and time.                             Assessment & Plan        1. Non-recurrent acute suppurative otitis media of left ear with spontaneous rupture of tympanic membrane    - doxycycline (VIBRAMYCIN) 100 MG Tab; Take 1 Tablet by mouth 2 times a day for 7 days.  Dispense: 14 Tablet; Refill: 0    Discussed exam findings with Sasha.  Differential reviewed.  Take full course of antibiotics.  Risks and benefits reviewed.  GI and photosensitivity precautions discussed.   OTC NSAIDs or tylenol prn pain.  Maintain adequate po hydration.  RTC in 7 days if symptoms persist, sooner if worse.  She verbalized understanding of and agreed with plan of care.